# Patient Record
Sex: FEMALE | Race: OTHER | NOT HISPANIC OR LATINO | URBAN - METROPOLITAN AREA
[De-identification: names, ages, dates, MRNs, and addresses within clinical notes are randomized per-mention and may not be internally consistent; named-entity substitution may affect disease eponyms.]

---

## 2019-03-02 ENCOUNTER — EMERGENCY (EMERGENCY)
Facility: HOSPITAL | Age: 34
LOS: 1 days | Discharge: HOME | End: 2019-03-02
Attending: EMERGENCY MEDICINE
Payer: MEDICAID

## 2019-03-02 VITALS
TEMPERATURE: 98 F | DIASTOLIC BLOOD PRESSURE: 78 MMHG | OXYGEN SATURATION: 99 % | HEIGHT: 63 IN | HEART RATE: 75 BPM | WEIGHT: 139.99 LBS | SYSTOLIC BLOOD PRESSURE: 113 MMHG | RESPIRATION RATE: 18 BRPM

## 2019-03-02 PROCEDURE — 99283 EMERGENCY DEPT VISIT LOW MDM: CPT

## 2019-03-02 PROCEDURE — 73030 X-RAY EXAM OF SHOULDER: CPT | Mod: 26,RT

## 2019-03-02 PROCEDURE — 73030 X-RAY EXAM OF SHOULDER: CPT

## 2019-03-02 RX ORDER — LIDOCAINE 4 G/100G
1 CREAM TOPICAL ONCE
Qty: 0 | Refills: 0 | Status: COMPLETED | OUTPATIENT
Start: 2019-03-02 | End: 2019-03-02

## 2019-03-02 RX ORDER — DIAZEPAM 5 MG
1 TABLET ORAL
Qty: 9 | Refills: 0 | OUTPATIENT
Start: 2019-03-02 | End: 2019-03-04

## 2019-03-02 RX ORDER — DIAZEPAM 5 MG
5 TABLET ORAL ONCE
Qty: 0 | Refills: 0 | Status: DISCONTINUED | OUTPATIENT
Start: 2019-03-02 | End: 2019-03-02

## 2019-03-02 RX ORDER — IBUPROFEN 200 MG
600 TABLET ORAL ONCE
Qty: 0 | Refills: 0 | Status: COMPLETED | OUTPATIENT
Start: 2019-03-02 | End: 2019-03-02

## 2019-03-02 RX ADMIN — Medication 600 MILLIGRAM(S): at 14:51

## 2019-03-02 RX ADMIN — LIDOCAINE 1 PATCH: 4 CREAM TOPICAL at 14:50

## 2019-03-02 RX ADMIN — Medication 5 MILLIGRAM(S): at 14:51

## 2019-03-02 RX ADMIN — Medication 600 MILLIGRAM(S): at 15:50

## 2019-03-02 NOTE — ED ADULT NURSE NOTE - NSIMPLEMENTINTERV_GEN_ALL_ED
Implemented All Universal Safety Interventions:  [place] to call system. Call bell, personal items and telephone within reach. Instruct patient to call for assistance. Room bathroom lighting operational. Non-slip footwear when patient is off stretcher. Physically safe environment: no spills, clutter or unnecessary equipment. Stretcher in lowest position, wheels locked, appropriate side rails in place.

## 2019-03-02 NOTE — ED PROVIDER NOTE - PROGRESS NOTE DETAILS
PGY1/MD [place]. NEXUS negative, no indication of cervical Xray. occasional headache, tension headache.

## 2019-03-02 NOTE — ED ADULT NURSE NOTE - OBJECTIVE STATEMENT
Patient  is  alert   and  oriented x3.  Color is  good  and  skin warm to touch.   She  is  c/o  headache  and  bilateral  shoulders   pain.  She denies  fall  or  injury.  No  c/o  numbness  or  tingling  to extremities  offered.

## 2019-03-02 NOTE — ED PROVIDER NOTE - OBJECTIVE STATEMENT
35 y/o female with no significant pmhx c/o 8/10 neck pain radiating to shoulders bilaterally x2 weeks. Pain began at night when she was laying down and is now reproducible with movement. Pt states that the pain began on the right side of her neck and is now also present on the left. Notes that there is no pain to palpation. She has never had similar sxs. Pt took Advil which helped resolve some sxs. Last took Advil 200mg last night. Pt also notes that she had a HA last night secondary to pain which has now resolved.  Denies trauma to area, numbness, tingling, weakness, or N/V.     Pt speaks Polish and translation was provided by accompanying family member.

## 2019-03-02 NOTE — ED PROVIDER NOTE - NSFOLLOWUPINSTRUCTIONS_ED_ALL_ED_FT
- You were evaluated in the ED due to shoulder pain. After evaluation, you have a clinical picture of a right shoulder strain.  - Take Ibuprofen 400 mg every 8 hours for pain.  - Apply an ice bag on affected area for 20 minutes 3 times a day for the next 3-5 days  - Follow up with your primary MD if symptoms return  - Return to the ED if any complication arises

## 2019-09-16 ENCOUNTER — APPOINTMENT (OUTPATIENT)
Dept: INTERNAL MEDICINE | Facility: CLINIC | Age: 34
End: 2019-09-16

## 2019-09-16 PROBLEM — Z00.00 ENCOUNTER FOR PREVENTIVE HEALTH EXAMINATION: Status: ACTIVE | Noted: 2019-09-16

## 2023-01-19 NOTE — ED ADULT TRIAGE NOTE - TEMPERATURE IN FAHRENHEIT (DEGREES F)
Quality 130: Documentation Of Current Medications In The Medical Record: Current Medications Documented
Detail Level: Simple
98.1